# Patient Record
Sex: FEMALE | Race: WHITE | NOT HISPANIC OR LATINO | Employment: OTHER | URBAN - METROPOLITAN AREA
[De-identification: names, ages, dates, MRNs, and addresses within clinical notes are randomized per-mention and may not be internally consistent; named-entity substitution may affect disease eponyms.]

---

## 2023-10-16 ENCOUNTER — CLINICAL SUPPORT (OUTPATIENT)
Dept: DERMATOLOGY | Facility: CLINIC | Age: 83
End: 2023-10-16
Payer: MEDICARE

## 2023-10-16 DIAGNOSIS — C44.319 BASAL CELL CARCINOMA (BCC) OF SKIN OF OTHER PART OF FACE: ICD-10-CM

## 2023-10-16 DIAGNOSIS — Z48.02 VISIT FOR SUTURE REMOVAL: ICD-10-CM

## 2023-10-16 PROCEDURE — 99024 POSTOP FOLLOW-UP VISIT: CPT | Performed by: DERMATOLOGY

## 2023-10-16 NOTE — PROGRESS NOTES
Subjective     Isa Echeverria is a 83 y.o. female who presents for the following: Nurse Visit (Isidro bryant).     Review of Systems:  No other skin or systemic complaints other than what is documented elsewhere in the note.    The following portions of the chart were reviewed this encounter and updated as appropriate:          Skin Cancer History  No skin cancer on file.      Specialty Problems    None       Objective   Well appearing patient in no apparent distress; mood and affect are within normal limits.        Assessment/Plan   1. Visit for suture removal  Right Cheek

## 2024-01-02 ENCOUNTER — OFFICE VISIT (OUTPATIENT)
Dept: DERMATOLOGY | Facility: CLINIC | Age: 84
End: 2024-01-02
Payer: MEDICARE

## 2024-01-02 DIAGNOSIS — Z12.83 SKIN CANCER SCREENING: Primary | ICD-10-CM

## 2024-01-02 DIAGNOSIS — D22.9 NEVUS: ICD-10-CM

## 2024-01-02 DIAGNOSIS — L82.1 SEBORRHEIC KERATOSIS: ICD-10-CM

## 2024-01-02 DIAGNOSIS — D18.01 CHERRY ANGIOMA: ICD-10-CM

## 2024-01-02 DIAGNOSIS — L81.4 LENTIGO: ICD-10-CM

## 2024-01-02 DIAGNOSIS — Z85.828 PERSONAL HISTORY OF SKIN CANCER: ICD-10-CM

## 2024-01-02 PROCEDURE — 1159F MED LIST DOCD IN RCRD: CPT | Performed by: NURSE PRACTITIONER

## 2024-01-02 PROCEDURE — 99213 OFFICE O/P EST LOW 20 MIN: CPT | Performed by: NURSE PRACTITIONER

## 2024-01-02 NOTE — PROGRESS NOTES
Subjective     Isa Echeverria is a 83 y.o. female who presents for the following: Skin Check.     Established patient. In for full body skin check.       Review of Systems:  No other skin or systemic complaints other than what is documented elsewhere in the note.    The following portions of the chart were reviewed this encounter and updated as appropriate:       Skin Cancer History    Specialty Problems    None    Past Medical History:  Isa Echeverria  has no past medical history on file.    Past Surgical History:  Isa Echeverria  has a past surgical history that includes Other surgical history (05/23/2014); Other surgical history (05/23/2014); and [place] lymph node biopsy (05/23/2014).    Family History:  Patient family history is not on file.    Social History:  Isa Echeverria  has no history on file for tobacco use, alcohol use, and drug use.    Allergies:  Patient has no known allergies.    Current Medications / CAM's:  No current outpatient medications on file.     Objective   Well appearing patient in no apparent distress; mood and affect are within normal limits.    A full examination was performed including scalp, head, eyes, ears, nose, lips, neck, chest, axillae, abdomen, back, buttocks, bilateral upper extremities, bilateral lower extremities, hands, feet, fingers, toes, fingernails, and toenails. All findings within normal limits unless otherwise noted below.    Assessment/Plan   1. Skin cancer screening    The patient presented for a routine skin examination today. There are no specific concerns regarding skin health and no new or changing moles, lesions, or rashes.     Assessment: Based on the comprehensive skin examination, there were no concerning or abnormal findings. The patient's skin appeared to be in good health, without any notable dermatologic conditions or lesions.    Plan: Given the absence of any significant skin findings, no specific interventions or treatments are warranted at this time.  The patient was educated on the importance of regular skin self-examinations and advised to promptly report any changes or concerns. Routine follow-up for a skin examination was recommended.    -These lesions have benign, reassuring patterns on dermoscopy.  -There were no concerning features found on exam today.  -Recommend continued self-observation, and to contact the office if any changes in nevi are  noticed.    Discussed/information given on safe sun practices and use of sunscreen, sun protective clothing or sun avoidance. Recommend to use OTC medication of sunscreen SPF 30 or higher on a daily basis prior to sun exposure to reduce the risk of skin cancer.    Contact Office if: Any lesions change in size, shape or color; itch, bum or bleed.         2. Nevus  Multiple benign appearing flesh colored to pigmented macules and papules     Plan: Counseling.  I counseled the patient regarding the following:  Instructions: Monthly self-skin checks to monitor for any changes in moles are recommended. Expectations: Benign Nevi are pigmented nests of cells within the skin.No treatment is necessary. Contact Office if: Any moles change in size, shape or color; itch, bum or bleed.    3. Lentigo  Benign pigmented macules appearing in sun exposed areas     Solar lentigo (a type of lentigo also known as a senile lentigo, age spot, or liver spot) is a benign pigmented macule appearing on fair-skinned individuals that is related to ultraviolet radiation (UVR) exposure, typically from the sun.     PLAN:  Limiting sun exposure through avoidance, protective clothing, and use of sunscreens can help prevent the appearance of solar lentigines.    If lesion changes or becomes symptomatic she should return to clinic    4. Maciel angioma  Small (2-10 mm), bright red or violaceous macules or smooth, domed papules    PLAN:  Reassurance these lesions are benign  Treatment is only indicated in the case of irritation or hemorrhage    5.  "Seborrheic keratosis    Seborrheic keratoses (SKs) are extremely common benign neoplasms of the skin. There can be few or hundreds of these raised, \"stuck-on\"-appearing papules and plaques with well-defined borders. The cause is unknown, although there is a familial trait for the development of multiple SKs.      SKs tend to increase in incidence and number with increasing age.     Skin Care: Seborrheic Keratoses are benign. No treatment is necessary.    Patient was instructed to call the office if any lesions become irritated or inflamed        6. Personal history of skin cancer    No evidence of recurrence in scar and benign ROS.   Continue with total body skin exams every 6 months.  ABCDEs of melanoma and atypical moles were discussed with the patient.  Patient was instructed to perform monthly self skin examination.  We recommended that the patient have regular full skin exams given an increased risk of subsequent skin cancers.  The patient was instructed to use sun protective behaviors including use of broad spectrum sunscreens and sun protective clothing to reduce risk of skin cancers.             "

## 2024-03-25 ENCOUNTER — APPOINTMENT (OUTPATIENT)
Dept: DERMATOLOGY | Facility: CLINIC | Age: 84
End: 2024-03-25
Payer: MEDICARE

## 2024-07-02 ENCOUNTER — APPOINTMENT (OUTPATIENT)
Dept: DERMATOLOGY | Facility: CLINIC | Age: 84
End: 2024-07-02
Payer: MEDICARE

## 2024-07-09 ENCOUNTER — APPOINTMENT (OUTPATIENT)
Dept: DERMATOLOGY | Facility: CLINIC | Age: 84
End: 2024-07-09
Payer: MEDICARE

## 2024-07-09 DIAGNOSIS — Z85.828 PERSONAL HISTORY OF SKIN CANCER: ICD-10-CM

## 2024-07-09 DIAGNOSIS — D48.5 NEOPLASM OF UNCERTAIN BEHAVIOR OF SKIN: ICD-10-CM

## 2024-07-09 DIAGNOSIS — L81.4 LENTIGO: ICD-10-CM

## 2024-07-09 DIAGNOSIS — L82.1 SEBORRHEIC KERATOSIS: ICD-10-CM

## 2024-07-09 DIAGNOSIS — D22.9 NEVUS: Primary | ICD-10-CM

## 2024-07-09 DIAGNOSIS — Z12.83 SKIN CANCER SCREENING: ICD-10-CM

## 2024-07-09 PROCEDURE — 99213 OFFICE O/P EST LOW 20 MIN: CPT | Performed by: NURSE PRACTITIONER

## 2024-07-09 PROCEDURE — 1159F MED LIST DOCD IN RCRD: CPT | Performed by: NURSE PRACTITIONER

## 2024-07-09 NOTE — PROGRESS NOTES
Subjective     Isa Echeverria is a 83 y.o. female who presents for the following: Skin Check.   Established patient in for 6 month full body skin exam.     Review of Systems:  No other skin or systemic complaints other than what is documented elsewhere in the note.    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    Past Surgical History:  Isa Echeverria  has a past surgical history that includes Other surgical history (05/23/2014); Other surgical history (05/23/2014); and [place] lymph node biopsy (05/23/2014).    Family History:  Patient family history is not on file.    Social History:  Isa Echeverria  has no history on file for tobacco use, alcohol use, and drug use.    Allergies:  Patient has no known allergies.    Current Medications / CAM's:  No current outpatient medications on file.     Objective   Well appearing patient in no apparent distress; mood and affect are within normal limits.      Assessment/Plan   1. Nevus  Multiple benign appearing flesh colored to pigmented macules and papules     Plan: Counseling.  I counseled the patient regarding the following:  Instructions: Monthly self-skin checks to monitor for any changes in moles are recommended. Expectations: Benign Nevi are pigmented nests of cells within the skin.No treatment is necessary. Contact Office if: Any moles change in size, shape or color; itch, bum or bleed.    2. Skin cancer screening    Skin check performed  - Multiple benign lesions noted on exam today  - Continue to monitor for changing or new lesions over time.  - Concerning lesions biopsied today documented  "    Patient's nevi are all symmetric, even bordered, even colored, and less than 6mm with any exceptions as noted below or in physical exam. Instructed to monitor all nevi for change and provided patient education on the warning signs of melanoma and non-melanoma skin cancers. Recommended protection from excess sun exposure by wearing SPF 30, broad-spectrum sunblocks and sunscreens. Follow up for any questionable or changing lesions sooner than the regularly scheduled skin exams.     Advised patient to return for re-evaluation if any lesions are new, changing, or suspicious in any way.    Related Procedures  Follow Up In Dermatology - Established Patient    3. Lentigo  Scattered tan macules in sun-exposed areas.    Solar lentigo (a type of lentigo also known as a senile lentigo, age spot, or liver spot) is a benign pigmented macule appearing on fair-skinned individuals that is related to ultraviolet radiation (UVR) exposure, typically from the sun.     PLAN:  Limiting sun exposure through avoidance, protective clothing, and use of sunscreens can help prevent the appearance of solar lentigines.    If lesion changes or becomes symptomatic she should return to clinic    4. Seborrheic keratosis    Seborrheic keratoses (SKs) are extremely common benign neoplasms of the skin. There can be few or hundreds of these raised, \"stuck-on\"-appearing papules and plaques with well-defined borders. The cause is unknown, although there is a familial trait for the development of multiple SKs.      SKs tend to increase in incidence and number with increasing age.     Skin Care: Seborrheic Keratoses are benign. No treatment is necessary.    Patient was instructed to call the office if any lesions become irritated or inflamed        5. Personal history of skin cancer    No evidence of recurrence in scar and benign ROS.   Continue with total body skin exams every 6 months.  ABCDEs of melanoma and atypical moles were discussed with the " patient.  Patient was instructed to perform monthly self skin examination.  We recommended that the patient have regular full skin exams given an increased risk of subsequent skin cancers.  The patient was instructed to use sun protective behaviors including use of broad spectrum sunscreens and sun protective clothing to reduce risk of skin cancers.      6. Neoplasm of uncertain behavior of skin  Mid Parietal Scalp              Lesion biopsy  Type of biopsy: tangential    Informed consent: discussed and consent obtained    Timeout: patient name, date of birth, surgical site, and procedure verified    Procedure prep:  Patient was prepped and draped  Anesthesia: the lesion was anesthetized in a standard fashion    Anesthetic:  1% lidocaine w/ epinephrine 1-100,000 local infiltration  Instrument used: DermaBlade    Hemostasis achieved with: aluminum chloride    Outcome: patient tolerated procedure well    Post-procedure details: sterile dressing applied and wound care instructions given    Dressing type: petrolatum and bandage      Staff Communication: Dermatology Local Anesthesia: 1 % Lidocaine / Epinephrine - Amount: 1ml    Specimen 1 - Dermatopathology- DERM LAB  Differential Diagnosis: NMSC vs other  Check Margins Yes/No?:    Comments:    Dermpath Lab: Routine Histopathology (formalin-fixed tissue)    - Discussed differential with patient in clinic today.   - Given uncertainty in clinical diagnosis, biopsy is recommended in clinic today.  - The patient expressed understanding, is in agreement with this plan, and wishes to proceed with biopsy.  - Oral and written wound care instructions provided.  - Advised patient that the office will call within 2 weeks to discuss biopsy results.

## 2024-07-11 LAB
LABORATORY COMMENT REPORT: NORMAL
PATH REPORT.FINAL DX SPEC: NORMAL
PATH REPORT.GROSS SPEC: NORMAL
PATH REPORT.MICROSCOPIC SPEC OTHER STN: NORMAL
PATH REPORT.RELEVANT HX SPEC: NORMAL
PATH REPORT.TOTAL CANCER: NORMAL

## 2024-07-15 NOTE — RESULT ENCOUNTER NOTE
Called and spoke with patient updated on results. Order placed in chart, please sign.   Patient awaiting scheduling.

## 2024-09-04 ENCOUNTER — APPOINTMENT (OUTPATIENT)
Dept: DERMATOLOGY | Facility: CLINIC | Age: 84
End: 2024-09-04
Payer: MEDICARE

## 2024-09-04 VITALS — HEART RATE: 85 BPM | DIASTOLIC BLOOD PRESSURE: 83 MMHG | SYSTOLIC BLOOD PRESSURE: 146 MMHG

## 2024-09-04 DIAGNOSIS — C44.91 BASAL CELL CARCINOMA (BCC), UNSPECIFIED SITE: ICD-10-CM

## 2024-09-04 PROCEDURE — 13121 CMPLX RPR S/A/L 2.6-7.5 CM: CPT | Performed by: DERMATOLOGY

## 2024-09-04 PROCEDURE — 17312 MOHS ADDL STAGE: CPT | Performed by: DERMATOLOGY

## 2024-09-04 PROCEDURE — 17311 MOHS 1 STAGE H/N/HF/G: CPT | Performed by: DERMATOLOGY

## 2024-09-04 RX ORDER — METOPROLOL SUCCINATE 25 MG/1
25 TABLET, EXTENDED RELEASE ORAL
COMMUNITY
Start: 2023-03-14

## 2024-09-04 RX ORDER — LATANOPROST 50 UG/ML
SOLUTION/ DROPS OPHTHALMIC
COMMUNITY
Start: 2019-07-26

## 2024-09-04 RX ORDER — CELECOXIB 100 MG/1
100 CAPSULE ORAL 2 TIMES DAILY
COMMUNITY

## 2024-09-04 RX ORDER — AMLODIPINE BESYLATE 5 MG/1
TABLET ORAL
COMMUNITY
Start: 2023-03-14

## 2024-09-04 RX ORDER — METHOTREXATE 2.5 MG/1
TABLET ORAL
COMMUNITY

## 2024-09-04 RX ORDER — FOLIC ACID 0.8 MG
TABLET ORAL
COMMUNITY
Start: 2016-05-09

## 2024-09-04 RX ORDER — CEPHALEXIN 500 MG/1
500 CAPSULE ORAL 2 TIMES DAILY
Qty: 14 CAPSULE | Refills: 0 | Status: SHIPPED | OUTPATIENT
Start: 2024-09-04 | End: 2024-09-11

## 2024-09-04 NOTE — PROGRESS NOTES
Mohs Surgery Operative Note    Date of Surgery:  9/4/2024  Surgeon:  Naeem Fisher MD PhD  Office Location: [address]  [facility] [address]  Dept: [phone]  Dept Fax: [phone]  Referring Provider: Renata Gray, APRN-CNP  [address]  [place] [address]      Assessment/Plan   Pre-procedure:   Obtained informed consent: written from patient  The surgical site was identified and confirmed with the patient.     Intra-operative:   Audible time out called at : 12:07 PM 09/04/24  by: Ilana Little RN   Verified patient name, birthdate, site, specimen bottle label & requisition.    The planned procedure(s) was again reviewed with the patient. The risks of bleeding, infection, nerve damage and scarring were reviewed. Written authorization was obtained. The patient identity, surgical site, and planned procedure(s) were verified. The provider acted as both surgeon and pathologist.     Basal cell carcinoma (BCC), unspecified site  Mid Parietal Scalp    Mohs surgery    Consent obtained: written    Universal Protocol:  Procedure explained and questions answered to patient or proxy's satisfaction: Yes    Test results available and properly labeled: Yes    Pathology report reviewed: Yes    External notes reviewed: Yes    Photo or diagram used for site identification: Yes    Site/side marked: Yes    Slide independently reviewed by Mohs surgeon: Yes    Immediately prior to procedure a time out was called: Yes    Patient identity confirmed: verbally with patient  Preparation: Patient was prepped and draped in usual sterile fashion      Anticoagulation:  Is the patient taking prescription anticoagulant and/or aspirin prescribed/recommended by a physician? Yes    Was the anticoagulation regimen changed prior to Mohs? No      Anesthesia:  Anesthesia method: local infiltration  Local anesthetic: lidocaine 1% WITH epi    Procedure Details:  Case ID Number:  [id number]  Biopsy accession number: [id number]  Date of biopsy: 7/9/2024  Frozen section biopsy performed: No    Specimen debulked: Yes ([facility])    Pre-Op diagnosis: basal cell carcinoma  BCC subtype: nodular  Surgery side: midline  Surgical site (from skin exam): Mid Parietal Scalp  Pre-operative length (cm): 2  Pre-operative width (cm): 2.3  Indications for Mohs surgery: anatomic location where tissue conservation is critical  Previously treated? No      Micrographic Surgery Details:  Post-operative length (cm): 3.5  Post-operative width (cm): 4  Number of Mohs stages: 2  Is this a complex case (associate members only): Yes      Stage 1     Comments: The patient was brought into the operating room and placed in the procedure chair in the appropriate position.  The area positive by previous biopsy was identified and confirmed with the patient. The area of clinically obvious tumor was debulked using a curette and/or scalpel as needed. An incision was made following the Mohs approach through the skin. The specimen was taken to the lab, divided into 2 piece(s) and appropriately chromacoded and processed.    .  Tumor was seen on both the lateral and deep margins as indicated on the on the Mohs map.  Nodular basal cell carcinoma. Histologic examination revealed large tumor aggregates of atypical basaloid cells with peripheral palisading and tumoral clefting.              Tumor features identified on Mohs section: basal carcinoma    Stage 2     Comments: The area of positivity as noted on the Mohs map in the previous stage was identified and removed using the Mohs technique. The specimen was taken to the lab and appropriately chromacoded and processed in 1 piece(s).               Tumor features identified on Mohs section: no tumor identified    Depth of defect: subcutaneous fat    Patient tolerance of procedure: tolerated well, no immediate complications    Reconstruction:  Was the defect reconstructed? Yes    Was  reconstruction performed by the same Mohs surgeon? Yes    Setting of reconstruction: outpatient office  When was reconstruction performed? same day  Type of reconstruction: linear  Linear reconstruction: complex  Length of linear repair (cm): 5.5  Subcutaneous Layers (Deep Stitches)   Suture size:  3-0  Suture type:  Vicryl  Stitches:  Buried vertical mattress  Fine/surface layer approximation (top stitches)   Epidermal/Superficial suture size:  4-0  Epidermal/Superficial suture type:  Prolene  Stitches: simple running    Suture removal (days):  14  Hemostasis achieved with: electrodesiccation  Outcome: patient tolerated procedure well with no complications    Post-procedure details: sterile dressing applied and wound care instructions given    Dressing type: Hypafix, pressure dressing, petrolatum and Telfa pad          Complex Linear Repair - Wide Undermining:  Given the location and size of the defect, it was determined that a complex layered linear closure was required to restore normal anatomy and function. The repair was considered complex because extensive undermining was required and performed. The amount of undermining performed was greater than the maximum width of the defect as measured perpendicular to the closure line along at least one entire edge of the defect. Standing cutaneous cones were removed using Burow's triangles. The wound edges were brought into close approximation with buried vertical mattress sutures. The remainder of the wound was then closed with epidermal top sutures.              The final repair measured 5.5 cm            Follow up on ear lobe repair from 2020 was completed today. Numbed first, pierced with 18G needle, and sterilized patient's earring was put in.     Wound care was discussed, and the patient was given written post-operative wound care instructions.      The patient will follow up with Naeem Fisher MD PhD as needed for any post operative problems or concerns, and  will follow up with their primary dermatologist as scheduled.

## 2024-09-04 NOTE — PROGRESS NOTES
Office Visit Note  Date: 9/4/2024  Surgeon:  Naeem Fisher MD PhD  Office Location: [address]  [address]  [facility] [address]  Dept: [phone]  Dept Fax: [phone]  Referring Provider: Renata Gray, APRN-CNP  [address]  [address]  [place] [address]    Subjective   Isa Echeverria is a 83 y.o. female who presents for the following: MOHS Surgery (Mid parietal scalp, BCC)    According to the patient, the lesion has been present for approximately 6 months at the time of diagnosis.  The lesion is not causing symptoms.  The lesion has not been treated previously.    The patient does not have a pacemaker / defibrillator.  The patient does not have a heart valve / joint replacement.    The patient is on blood thinners.  The patient does not have a history of hepatitis B or C.  The patient does not have a history of HIV.  The patient does not have a history of immunosuppression (e.g. organ transplantation, malignancy, medications)    Review of Systems:  No other skin or systemic complaints other than what is documented elsewhere in the note.    MEDICAL HISTORY: clinically relevant history including significant past medical history, medications and allergies was reviewed and documented in Epic.    Objective   Well appearing patient in no apparent distress; mood and affect are within normal limits.  Vital signs: See record.  Noted on the Mid Parietal Scalp  Is a 2 x 2.3 cm scar          The patient confirmed the identified site.    Discussion:  The nature of the diagnosis was explained. The lesion is a skin cancer.  It has a risk of local growth and distant spread. The condition is associated with sun exposure.  Warning signs of non-melanoma skin cancer discussed. Patient was instructed to perform monthly self skin examination.  We recommended that the patient have regular full skin exams given an increased risk of subsequent skin cancers. The patient was instructed to  use sun protective behaviors including use of broad spectrum sunscreens and sun protective clothing to reduce risk of skin cancers.      Risks, benefits, side effects of Mohs surgery were discussed with patient and the patient voiced understanding.  It was explained that even though the cure rate of Mohs is very high it is not 100%. Risks of surgery including but not limited to bleeding, infection, numbness, nerve damage, and scar were reviewed.  Discussion included wound care requirements, activity restrictions, likely scar outcome and time to heal.     After Mohs surgery, the defect may need to be repaired surgically and the scar may be longer than the original lesion.  Reconstruction options, risks, and benefits were reviewed including second intention healing, linear repair (4-1 ratio was explained), local flaps, skin grafts, cartilage grafts and interpolation flaps (the need for multiple surgeries was explained). Possible outcomes were reviewed including likely scar appearance, failure of flap survival, infection, bleeding and the need for revision surgery.     The pathology was reviewed, the photograph was reviewed, and the referring physician's note was reviewed.    Patient elected for Mohs surgery.

## 2024-09-18 ENCOUNTER — APPOINTMENT (OUTPATIENT)
Dept: DERMATOLOGY | Facility: CLINIC | Age: 84
End: 2024-09-18
Payer: MEDICARE

## 2024-09-18 DIAGNOSIS — Z48.02 ENCOUNTER FOR REMOVAL OF SUTURES: ICD-10-CM

## 2024-09-18 PROCEDURE — 99024 POSTOP FOLLOW-UP VISIT: CPT | Performed by: DERMATOLOGY

## 2024-09-18 NOTE — PROGRESS NOTES
Office Follow Up Note    Visit Summary  Chief Complaint    1. Complaint Wound check.    Isa Echeverria is a 84 y.o. female who presents for 2 week follow up after surgery for a basal cell carcinoma. The patient has no concerns today.     Location Operation site location: mid parietal scalp    On exam,  Ms. Echeverria is well-appearing and in no apparent distress. The surgical site appears clean with minimal to no erythema. No tenderness and good wound edge apposition.    Assessment and Plan:  History of skin cancer requiring ongoing monitoring for recurrence and additional lesion development.   The patient was reassured that the wound is healing appropriately.   Sutures were removed without complication today.  The dressing was removed, the wound cleaned a a new dressing reapplied.   Left ear lobe also healed nicely.    The patient was instructed to call with any further concerns. The patient will return as needed.

## 2024-12-06 ENCOUNTER — OFFICE VISIT (OUTPATIENT)
Dept: DERMATOLOGY | Facility: CLINIC | Age: 84
End: 2024-12-06
Payer: MEDICARE

## 2024-12-06 DIAGNOSIS — L57.8 ACTINIC SKIN DAMAGE: Primary | ICD-10-CM

## 2024-12-06 DIAGNOSIS — D49.2 NEOPLASM OF SKIN: ICD-10-CM

## 2024-12-06 PROCEDURE — 99213 OFFICE O/P EST LOW 20 MIN: CPT | Performed by: NURSE PRACTITIONER

## 2024-12-06 RX ORDER — FLUOROURACIL 50 MG/G
CREAM TOPICAL 2 TIMES DAILY
Qty: 40 G | Refills: 0 | Status: SHIPPED | OUTPATIENT
Start: 2024-12-06 | End: 2025-01-17

## 2024-12-06 NOTE — PROGRESS NOTES
Subjective     Isa Echeverria is a 84 y.o. female who presents for the following: multipe lesion.   Established patient in for multiple lesions to face.     Review of Systems:  No other skin or systemic complaints other than what is documented elsewhere in the note.    The following portions of the chart were reviewed this encounter and updated as appropriate:       Skin Cancer History  Biopsy Date Type Location Status   7/9/24 [facility] Mid Parietal Scalp Treatment Complete  9/4/24     Specialty Problems    None    Past Medical History:  Isa Echeverria  has no past medical history on file.    Past Surgical History:  Isa Echeverria  has a past surgical history that includes Other surgical history (05/23/2014); Other surgical history (05/23/2014); and [place] lymph node biopsy (05/23/2014).    Family History:  Patient family history is not on file.    Social History:  Isa Echeverria  has no history on file for tobacco use, alcohol use, and drug use.    Allergies:  Patient has no known allergies.    Current Medications / CAM's:    Current Outpatient Medications:     amLODIPine (Norvasc) 5 mg tablet, Take by mouth., Disp: , Rfl:     celecoxib (CeleBREX) 100 mg capsule, Take 1 capsule (100 mg) by mouth twice a day., Disp: , Rfl:     fluorouracil (Efudex) 5 % cream cream, Apply topically 2 times a day. Apply to affected areas twice daily for a total of two weeks.  Wash hands thoroughly after each application., Disp: 40 g, Rfl: 0    folic acid (Folvite) 800 mcg tablet, Take by mouth., Disp: , Rfl:     latanoprost (Xalatan) 0.005 % ophthalmic solution, Administer into affected eye(s)., Disp: , Rfl:     methotrexate (Trexall) 2.5 mg tablet, Take by mouth., Disp: , Rfl:     metoprolol succinate XL (Toprol-XL) 25 mg 24 hr tablet, 1 tablet (25 mg)., Disp: , Rfl:      Objective   Well appearing patient in no apparent distress; mood and affect are within normal limits.      Assessment/Plan   1. Actinic skin damage (6)  Dorsum of Nose,  Left Buccal Cheek, Left Malar Cheek, Right Buccal Cheek, Right Malar Cheek, Right Nasal Sidewall  Numerous erythematous macules and patches with sandpaper texture      -Discussed nature of condition  -Reviewed treatment options  -Recommend to proceed with field therapy given number of lesions/extent of involvement    -Recommend application of Rx Efudex/Fluorouracil cream to the affected areas twice daily until maximal inflammation is reached. The expected course of therapy is usually two weeks for actinic keratoses. The skin will become red, blistered, eroded, crusted and scabbed. Wash hands after application to avoid spread to unintentional areas. There is a potential for scarring and pigmentary alterations (lighter or darker discoloration to the skin) which may be permanent following treatment.  There is a rare chance of systemic adverse reactions including flu-like symptoms or allergic reaction.  Must practice strict sun avoidance during and after therapy until healed to avoid a scarring sunburn as this medication causes severe photosensitivity.    -Educational handout on the use of rx Efudex/Fluorouracil given to the patient today.  -The patient verbalizes understanding.     fluorouracil (Efudex) 5 % cream cream - Dorsum of Nose, Left Buccal Cheek, Left Malar Cheek, Right Buccal Cheek, Right Malar Cheek, Right Nasal Sidewall  Apply topically 2 times a day. Apply to affected areas twice daily for a total of two weeks.  Wash hands thoroughly after each application.    2. Neoplasm of skin  Left Upper Eyelid  Currently being treated as a stye by eye doctor.  Given history, low threshold for biopsy if it doesn't heal

## 2025-01-30 ENCOUNTER — APPOINTMENT (OUTPATIENT)
Dept: DERMATOLOGY | Facility: CLINIC | Age: 85
End: 2025-01-30
Payer: MEDICARE

## 2025-01-30 DIAGNOSIS — L57.8 ACTINIC SKIN DAMAGE: ICD-10-CM

## 2025-01-30 DIAGNOSIS — Z85.828 HISTORY OF NONMELANOMA SKIN CANCER: ICD-10-CM

## 2025-01-30 DIAGNOSIS — Z85.820 HISTORY OF MALIGNANT MELANOMA: ICD-10-CM

## 2025-01-30 DIAGNOSIS — L82.1 SEBORRHEIC KERATOSIS: ICD-10-CM

## 2025-01-30 DIAGNOSIS — L81.4 LENTIGO: ICD-10-CM

## 2025-01-30 DIAGNOSIS — Z12.83 SCREENING EXAM FOR SKIN CANCER: ICD-10-CM

## 2025-01-30 DIAGNOSIS — D22.9 NEVUS: Primary | ICD-10-CM

## 2025-01-30 PROCEDURE — 99213 OFFICE O/P EST LOW 20 MIN: CPT | Performed by: NURSE PRACTITIONER

## 2025-01-30 PROCEDURE — 1159F MED LIST DOCD IN RCRD: CPT | Performed by: NURSE PRACTITIONER

## 2025-01-30 NOTE — PROGRESS NOTES
Subjective     Isa Echeverria is a 84 y.o. female who presents for the following: Skin Check.   Established patient in for 6 month full body skin exam.       Review of Systems:  No other skin or systemic complaints other than what is documented elsewhere in the note.    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    Past Surgical History:  Isa Echeverria  has a past surgical history that includes Other surgical history (05/23/2014); Other surgical history (05/23/2014); and [place] lymph node biopsy (05/23/2014).    Family History:  Patient family history is not on file.    Social History:  Isa Echeverria  has no history on file for tobacco use, alcohol use, and drug use.    Allergies:  Patient has no known allergies.    Current Medications / CAM's:    Current Outpatient Medications:     amLODIPine (Norvasc) 5 mg tablet, Take by mouth., Disp: , Rfl:     celecoxib (CeleBREX) 100 mg capsule, Take 1 capsule (100 mg) by mouth twice a day., Disp: , Rfl:     folic acid (Folvite) 800 mcg tablet, Take by mouth., Disp: , Rfl:     latanoprost (Xalatan) 0.005 % ophthalmic solution, Administer into affected eye(s)., Disp: , Rfl:     methotrexate (Trexall) 2.5 mg tablet, Take by mouth., Disp: , Rfl:     metoprolol succinate XL (Toprol-XL) 25 mg 24 hr tablet, 1 tablet (25 mg)., Disp: , Rfl:      Objective   Well appearing patient in no apparent distress; mood and affect are within normal limits.      Assessment/Plan   1. Nevus  Uniform pigmented macule(s)/papule(s)  with reassuring findings on dermoscopy    -Discussed nature of condition  -Reassurance, benign-appearing features on examination today  -Recommend continued observation    2. Lentigo  Tan macules    -Benign appearing on exam  -Reassurance, recommend observation    3. Seborrheic keratosis  Stuck on, waxy macule(s)/papule(s)/plaque(s) with comedo-like openings and milia like cysts    -Discussed nature of condition  -Reassurance, recommend continued observation    4. Actinic skin damage  Head - Anterior (Face)  Numerous erythematous macules and patches with sandpaper texture resolved with a 2 week course of efudex 5% cream      -Discussed nature of condition  -Reviewed treatment options      5. History of nonmelanoma skin cancer  Personal History of Non-Melanoma Skin Cancer  -Well healed scar(s) with no evidence of recurrence  -Discussed the need for annual or semi-annual skin examinations and to return sooner if any new or changing lesions are noticed. Patient verbalizes understanding    6. History of malignant melanoma  Personal History of Malignant Melanoma  -Well healed scar(s) with no evidence of recurrence  -Discussed the need for regular full skin examinations in the office, and monthly self examinations at home  -Discussed the need for annual ophthalmology exams with dilation  -Discussed concerning lesions and when to return sooner if any changes noted in skin lesions. Patient verbalizes understanding.    7. Screening exam for skin cancer

## 2025-07-29 ENCOUNTER — APPOINTMENT (OUTPATIENT)
Dept: DERMATOLOGY | Facility: CLINIC | Age: 85
End: 2025-07-29
Payer: MEDICARE

## 2025-07-29 DIAGNOSIS — Z12.83 SCREENING EXAM FOR SKIN CANCER: ICD-10-CM

## 2025-07-29 DIAGNOSIS — Z85.820 HISTORY OF MALIGNANT MELANOMA: ICD-10-CM

## 2025-07-29 DIAGNOSIS — L82.1 SEBORRHEIC KERATOSIS: ICD-10-CM

## 2025-07-29 DIAGNOSIS — D49.2 NEOPLASM OF SKIN: ICD-10-CM

## 2025-07-29 DIAGNOSIS — L81.4 LENTIGO: ICD-10-CM

## 2025-07-29 DIAGNOSIS — D22.9 NEVUS: Primary | ICD-10-CM

## 2025-07-29 DIAGNOSIS — Z85.828 HISTORY OF NONMELANOMA SKIN CANCER: ICD-10-CM

## 2025-07-29 PROCEDURE — 1159F MED LIST DOCD IN RCRD: CPT | Performed by: NURSE PRACTITIONER

## 2025-07-29 PROCEDURE — 99213 OFFICE O/P EST LOW 20 MIN: CPT | Performed by: NURSE PRACTITIONER

## 2025-07-29 NOTE — Clinical Note
"Concerning 5mm pink papule to left lash line treated by eye doctor prior to cataract surgery with an \"injection\".    "

## 2025-07-29 NOTE — Clinical Note
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

## 2025-07-29 NOTE — PROGRESS NOTES
Subjective     Isa Echeverria is a 84 y.o. female who presents for the following: Skin Check.     Review of Systems:  No other skin or systemic complaints other than what is documented elsewhere in the note.    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    Past Surgical History:  Isa Echeverria  has a past surgical history that includes Other surgical history (05/23/2014); Other surgical history (05/23/2014); and [place] lymph node biopsy (05/23/2014).    Family History:  Patient family history is not on file.    Social History:  Isa Echeverria  has no history on file for tobacco use, alcohol use, and drug use.    Allergies:  Patient has no known allergies.    Current Medications / CAM's:  Current Medications[1]     Objective   Well appearing patient in no apparent distress; mood and affect are within normal limits.      Assessment/Plan   Skin Exam  1. SCREENING EXAM FOR SKIN CANCER  Generalized  This Visit  - Follow Up In Dermatology  2. NEVUS  Generalized  Uniform pigmented macule(s)/papule(s) with reassuring findings on dermoscopy  -Discussed nature of condition  -Reassurance, benign-appearing features on examination today  -Recommend continued observation  3. LENTIGO  Generalized  Tan macules  -Benign appearing on exam  -Reassurance, recommend observation  4. SEBORRHEIC KERATOSIS  Generalized  Stuck on, waxy macule(s)/papule(s)/plaque(s) with comedo-like openings and milia like cysts  -Discussed nature of  "condition  -Reassurance, recommend continued observation  5. HISTORY OF NONMELANOMA SKIN CANCER  Generalized  Personal History of Non-Melanoma Skin Cancer  -Well healed scar(s) with no evidence of recurrence  -Discussed the need for annual or semi-annual skin examinations and to return sooner if any new or changing lesions are noticed. Patient verbalizes understanding  6. HISTORY OF MALIGNANT MELANOMA  Generalized  Personal History of Malignant Melanoma  -Well healed scar(s) with no evidence of recurrence  -Discussed the need for regular full skin examinations in the office, and monthly self examinations at home  -Discussed the need for annual ophthalmology exams with dilation  -Discussed concerning lesions and when to return sooner if any changes noted in skin lesions. Patient verbalizes understanding.  7. NEOPLASM OF SKIN  Left Upper Eyelid  Concerning 5mm pink papule to left lash line treated by eye doctor prior to cataract surgery with an \"injection\".    -Discussed the need for biopsy in the future, patient states we can monitor for now            [1]   Current Outpatient Medications:     amLODIPine (Norvasc) 5 mg tablet, Take by mouth., Disp: , Rfl:     celecoxib (CeleBREX) 100 mg capsule, Take 1 capsule (100 mg) by mouth twice a day., Disp: , Rfl:     folic acid (Folvite) 800 mcg tablet, Take by mouth., Disp: , Rfl:     latanoprost (Xalatan) 0.005 % ophthalmic solution, Administer into affected eye(s)., Disp: , Rfl:     methotrexate (Trexall) 2.5 mg tablet, Take by mouth., Disp: , Rfl:     metoprolol succinate XL (Toprol-XL) 25 mg 24 hr tablet, 1 tablet (25 mg)., Disp: , Rfl:     "

## 2026-01-27 ENCOUNTER — APPOINTMENT (OUTPATIENT)
Dept: DERMATOLOGY | Facility: CLINIC | Age: 86
End: 2026-01-27
Payer: MEDICARE